# Patient Record
Sex: FEMALE
[De-identification: names, ages, dates, MRNs, and addresses within clinical notes are randomized per-mention and may not be internally consistent; named-entity substitution may affect disease eponyms.]

---

## 2024-06-07 ENCOUNTER — DASHBOARD ENCOUNTERS (OUTPATIENT)
Age: 29
End: 2024-06-07

## 2024-06-07 ENCOUNTER — OFFICE VISIT (OUTPATIENT)
Dept: URBAN - METROPOLITAN AREA CLINIC 78 | Facility: CLINIC | Age: 29
End: 2024-06-07
Payer: COMMERCIAL

## 2024-06-07 ENCOUNTER — LAB OUTSIDE AN ENCOUNTER (OUTPATIENT)
Dept: URBAN - METROPOLITAN AREA CLINIC 78 | Facility: CLINIC | Age: 29
End: 2024-06-07

## 2024-06-07 VITALS
BODY MASS INDEX: 23.56 KG/M2 | HEART RATE: 61 BPM | TEMPERATURE: 98.1 F | SYSTOLIC BLOOD PRESSURE: 148 MMHG | WEIGHT: 120 LBS | RESPIRATION RATE: 14 BRPM | DIASTOLIC BLOOD PRESSURE: 76 MMHG | HEIGHT: 60 IN

## 2024-06-07 DIAGNOSIS — K58.1 IRRITABLE BOWEL SYNDROME WITH CONSTIPATION: ICD-10-CM

## 2024-06-07 DIAGNOSIS — K62.5 RECTAL BLEEDING: ICD-10-CM

## 2024-06-07 PROBLEM — 440630006: Status: ACTIVE | Noted: 2024-06-07

## 2024-06-07 PROCEDURE — 99203 OFFICE O/P NEW LOW 30 MIN: CPT | Performed by: INTERNAL MEDICINE

## 2024-06-07 PROCEDURE — 99243 OFF/OP CNSLTJ NEW/EST LOW 30: CPT | Performed by: INTERNAL MEDICINE

## 2024-06-07 NOTE — HPI-TODAY'S VISIT:
Patient was referred by Dr. Gaston Singh  A copy of this document will be sent to the physician.    Patient is a 28-year-old pleasant female seen in consultation for rectal bleeding.  She reports episodes of rectal bleeding 2 consecutive days the first day with bright red copious the second day of a small amount but mixed in stool.  She recalls this short self-limited episode approximately a year ago.  She describes the bowel movements as Johnson type I sometimes not all the time and also has abdominal cramping or discomfort leading to the bowel movements it is relieved by having a bowel movement.  She reports her constipation does respond to dietary modifications.  Denies family history of IBD or cancers in this denies any NSAID use.  She denies any external hemorrhoids or history suggestive of fissures.   Her labs were reportedly normal but was done last week

## 2024-06-10 ENCOUNTER — TELEPHONE ENCOUNTER (OUTPATIENT)
Dept: URBAN - METROPOLITAN AREA CLINIC 78 | Facility: CLINIC | Age: 29
End: 2024-06-10

## 2024-06-10 RX ORDER — SODIUM PICOSULFATE, MAGNESIUM OXIDE, AND ANHYDROUS CITRIC ACID 12; 3.5; 1 G/175ML; G/175ML; MG/175ML
175 ML THE FIRST DOSE THE EVENING BEFORE AND SECOND DOSE THE MORNING OF COLONOSCOPY LIQUID ORAL ONCE A DAY
Qty: 350 | OUTPATIENT
Start: 2024-06-10 | End: 2024-06-12

## 2024-06-11 ENCOUNTER — OFFICE VISIT (OUTPATIENT)
Dept: URBAN - METROPOLITAN AREA SURGERY CENTER 15 | Facility: SURGERY CENTER | Age: 29
End: 2024-06-11
Payer: COMMERCIAL

## 2024-06-11 DIAGNOSIS — K62.5 ANAL BLEEDING: ICD-10-CM

## 2024-06-11 DIAGNOSIS — K64.9 HEMORRHOIDS: ICD-10-CM

## 2024-06-11 DIAGNOSIS — K62.5 RECTAL BLEEDING: ICD-10-CM

## 2024-06-11 PROCEDURE — 00811 ANES LWR INTST NDSC NOS: CPT | Performed by: NURSE ANESTHETIST, CERTIFIED REGISTERED

## 2024-06-11 PROCEDURE — 45378 DIAGNOSTIC COLONOSCOPY: CPT | Performed by: INTERNAL MEDICINE

## 2024-06-14 ENCOUNTER — OFFICE VISIT (OUTPATIENT)
Dept: URBAN - METROPOLITAN AREA CLINIC 78 | Facility: CLINIC | Age: 29
End: 2024-06-14